# Patient Record
(demographics unavailable — no encounter records)

---

## 2024-11-11 NOTE — PROCEDURE
[Abnormal Uterine Bleeding] : abnormal uterine bleeding [Transvaginal Ultrasound] : transvaginal ultrasound [FreeTextEntry3] : RV uterus, thin EMS, no ff, no masses, b/l adnexa wnl, no evidnece of pregnancy

## 2024-11-11 NOTE — PHYSICAL EXAM
[Chaperone Present] : A chaperone was present in the examining room during all aspects of the physical examination [30391] : A chaperone was present during the pelvic exam. [Labia Majora] : normal [Labia Minora] : normal [Normal] : normal [Uterine Adnexae] : normal

## 2024-11-11 NOTE — HISTORY OF PRESENT ILLNESS
[FreeTextEntry1] : pt here for +UPT at home. reports that LMP was beginning of October, had at some point last week a +UPT but then bled slightly heavily over wkend, now bleeding has slowed down. bleeding about us much as menses maybe slightly less. no clots or pain. no anemic sx. still breasfteeding.  upreg in office negative

## 2025-02-04 NOTE — PLAN
[FreeTextEntry1] : WWE pap done last yr, neg urine preg is neg.  LMP too recent to see anything on sono. if no period for 2 more wks, advised urine preg test ret PRN

## 2025-02-04 NOTE — HISTORY OF PRESENT ILLNESS
[FreeTextEntry1] : 25 yo  baby now 14 months old at 11 months PP had chemical prengancy at that time she stopped nursing periods have been regular since then LMP 01/04/25 pt feeling very weak currently

## 2025-02-04 NOTE — PHYSICAL EXAM
[Chaperone Present] : A chaperone was present in the examining room during all aspects of the physical examination [58774] : A chaperone was present during the pelvic exam. [Soft] : soft [Non-tender] : non-tender [Non-distended] : non-distended [No Mass] : no mass [Oriented x3] : oriented x3 [Examination Of The Breasts] : a normal appearance [No Masses] : no breast masses were palpable [Labia Majora] : normal [Labia Minora] : normal [Normal] : normal [Uterine Adnexae] : normal

## 2025-05-06 NOTE — PHYSICAL EXAM
[MA] : MA [FreeTextEntry2] : Jim [Appropriately responsive] : appropriately responsive [Alert] : alert [No Acute Distress] : no acute distress [No Lymphadenopathy] : no lymphadenopathy [Soft] : soft [Non-tender] : non-tender [Non-distended] : non-distended [No HSM] : No HSM [No Lesions] : no lesions [No Mass] : no mass [Oriented x3] : oriented x3 [Labia Majora] : normal [Labia Minora] : normal [Normal] : normal [Uterine Adnexae] : normal

## 2025-05-06 NOTE — PROCEDURE
[Transvaginal OB Sonogram] : Transvaginal OB Sonogram [Intrauterine Pregnancy] : intrauterine pregnancy [Yolk Sac] : yolk sac present [Fetal Heart] : fetal heart present [Transvaginal OB Sonogram WNL] : Transvaginal OB Sonogram WNL [FreeTextEntry1] : single viable iup crl 8.2 weeks  no ff, c/w lmp jorge 12/15/25

## 2025-05-06 NOTE — HISTORY OF PRESENT ILLNESS
[FreeTextEntry1] : 25 y/o [id number] LMP 3/10/25, notes + ucg and cramps.  no bleeding,  no other complaints.    nsd x 3, largest 7-14 lbs  DPD deficiency  hypothyroid 100 mcg  dy neg no surgical hx

## 2025-05-06 NOTE — PLAN
[FreeTextEntry1] : 25 y/o p3 with normal exam and pain and pregnancy stable reassured precuations labs sent from office ref aneuploidy testing nut, counselling f/u 4 wks precautoins